# Patient Record
Sex: FEMALE | Race: BLACK OR AFRICAN AMERICAN
[De-identification: names, ages, dates, MRNs, and addresses within clinical notes are randomized per-mention and may not be internally consistent; named-entity substitution may affect disease eponyms.]

---

## 2018-08-02 PROBLEM — Z00.00 ENCOUNTER FOR PREVENTIVE HEALTH EXAMINATION: Status: ACTIVE | Noted: 2018-08-02

## 2018-08-17 ENCOUNTER — RESULT REVIEW (OUTPATIENT)
Age: 53
End: 2018-08-17

## 2018-10-15 ENCOUNTER — APPOINTMENT (OUTPATIENT)
Dept: UROLOGY | Facility: CLINIC | Age: 53
End: 2018-10-15
Payer: MEDICARE

## 2018-10-15 VITALS
HEART RATE: 82 BPM | HEIGHT: 66 IN | BODY MASS INDEX: 35.36 KG/M2 | WEIGHT: 220 LBS | SYSTOLIC BLOOD PRESSURE: 139 MMHG | DIASTOLIC BLOOD PRESSURE: 70 MMHG

## 2018-10-15 DIAGNOSIS — N20.0 CALCULUS OF KIDNEY: ICD-10-CM

## 2018-10-15 DIAGNOSIS — F17.200 NICOTINE DEPENDENCE, UNSPECIFIED, UNCOMPLICATED: ICD-10-CM

## 2018-10-15 DIAGNOSIS — Z80.8 FAMILY HISTORY OF MALIGNANT NEOPLASM OF OTHER ORGANS OR SYSTEMS: ICD-10-CM

## 2018-10-15 DIAGNOSIS — Z80.9 FAMILY HISTORY OF MALIGNANT NEOPLASM, UNSPECIFIED: ICD-10-CM

## 2018-10-15 DIAGNOSIS — Z78.9 OTHER SPECIFIED HEALTH STATUS: ICD-10-CM

## 2018-10-15 PROCEDURE — 99204 OFFICE O/P NEW MOD 45 MIN: CPT

## 2018-10-30 ENCOUNTER — OUTPATIENT (OUTPATIENT)
Dept: OUTPATIENT SERVICES | Facility: HOSPITAL | Age: 53
LOS: 1 days | Discharge: HOME | End: 2018-10-30

## 2018-10-30 VITALS
RESPIRATION RATE: 18 BRPM | HEART RATE: 60 BPM | WEIGHT: 221.34 LBS | HEIGHT: 67 IN | OXYGEN SATURATION: 98 % | TEMPERATURE: 97 F | SYSTOLIC BLOOD PRESSURE: 133 MMHG | DIASTOLIC BLOOD PRESSURE: 58 MMHG

## 2018-10-30 DIAGNOSIS — Z01.818 ENCOUNTER FOR OTHER PREPROCEDURAL EXAMINATION: ICD-10-CM

## 2018-10-30 DIAGNOSIS — N20.2 CALCULUS OF KIDNEY WITH CALCULUS OF URETER: ICD-10-CM

## 2018-10-30 DIAGNOSIS — Z98.51 TUBAL LIGATION STATUS: Chronic | ICD-10-CM

## 2018-10-30 LAB
ALBUMIN SERPL ELPH-MCNC: 4.4 G/DL — SIGNIFICANT CHANGE UP (ref 3.5–5.2)
ALP SERPL-CCNC: 74 U/L — SIGNIFICANT CHANGE UP (ref 30–115)
ALT FLD-CCNC: 19 U/L — SIGNIFICANT CHANGE UP (ref 0–41)
ANION GAP SERPL CALC-SCNC: 14 MMOL/L — SIGNIFICANT CHANGE UP (ref 7–14)
APPEARANCE UR: CLEAR — SIGNIFICANT CHANGE UP
APTT BLD: 33.8 SEC — SIGNIFICANT CHANGE UP (ref 27–39.2)
AST SERPL-CCNC: 16 U/L — SIGNIFICANT CHANGE UP (ref 0–41)
BASOPHILS # BLD AUTO: 0.02 K/UL — SIGNIFICANT CHANGE UP (ref 0–0.2)
BASOPHILS NFR BLD AUTO: 0.4 % — SIGNIFICANT CHANGE UP (ref 0–1)
BILIRUB SERPL-MCNC: 0.7 MG/DL — SIGNIFICANT CHANGE UP (ref 0.2–1.2)
BILIRUB UR-MCNC: NEGATIVE — SIGNIFICANT CHANGE UP
BUN SERPL-MCNC: 11 MG/DL — SIGNIFICANT CHANGE UP (ref 10–20)
CALCIUM SERPL-MCNC: 9.6 MG/DL — SIGNIFICANT CHANGE UP (ref 8.5–10.1)
CHLORIDE SERPL-SCNC: 102 MMOL/L — SIGNIFICANT CHANGE UP (ref 98–110)
CO2 SERPL-SCNC: 26 MMOL/L — SIGNIFICANT CHANGE UP (ref 17–32)
COLOR SPEC: YELLOW — SIGNIFICANT CHANGE UP
CREAT SERPL-MCNC: 0.8 MG/DL — SIGNIFICANT CHANGE UP (ref 0.7–1.5)
DIFF PNL FLD: NEGATIVE — SIGNIFICANT CHANGE UP
EOSINOPHIL # BLD AUTO: 0.01 K/UL — SIGNIFICANT CHANGE UP (ref 0–0.7)
EOSINOPHIL NFR BLD AUTO: 0.2 % — SIGNIFICANT CHANGE UP (ref 0–8)
GLUCOSE SERPL-MCNC: 89 MG/DL — SIGNIFICANT CHANGE UP (ref 70–99)
GLUCOSE UR QL: NEGATIVE MG/DL — SIGNIFICANT CHANGE UP
HCT VFR BLD CALC: 38.8 % — SIGNIFICANT CHANGE UP (ref 37–47)
HGB BLD-MCNC: 12.6 G/DL — SIGNIFICANT CHANGE UP (ref 12–16)
IMM GRANULOCYTES NFR BLD AUTO: 0.4 % — HIGH (ref 0.1–0.3)
INR BLD: 1.07 RATIO — SIGNIFICANT CHANGE UP (ref 0.65–1.3)
KETONES UR-MCNC: NEGATIVE — SIGNIFICANT CHANGE UP
LEUKOCYTE ESTERASE UR-ACNC: NEGATIVE — SIGNIFICANT CHANGE UP
LYMPHOCYTES # BLD AUTO: 1.99 K/UL — SIGNIFICANT CHANGE UP (ref 1.2–3.4)
LYMPHOCYTES # BLD AUTO: 43.6 % — SIGNIFICANT CHANGE UP (ref 20.5–51.1)
MCHC RBC-ENTMCNC: 29.6 PG — SIGNIFICANT CHANGE UP (ref 27–31)
MCHC RBC-ENTMCNC: 32.5 G/DL — SIGNIFICANT CHANGE UP (ref 32–37)
MCV RBC AUTO: 91.1 FL — SIGNIFICANT CHANGE UP (ref 81–99)
MONOCYTES # BLD AUTO: 0.48 K/UL — SIGNIFICANT CHANGE UP (ref 0.1–0.6)
MONOCYTES NFR BLD AUTO: 10.5 % — HIGH (ref 1.7–9.3)
NEUTROPHILS # BLD AUTO: 2.04 K/UL — SIGNIFICANT CHANGE UP (ref 1.4–6.5)
NEUTROPHILS NFR BLD AUTO: 44.9 % — SIGNIFICANT CHANGE UP (ref 42.2–75.2)
NITRITE UR-MCNC: NEGATIVE — SIGNIFICANT CHANGE UP
NRBC # BLD: 0 /100 WBCS — SIGNIFICANT CHANGE UP (ref 0–0)
PH UR: 6 — SIGNIFICANT CHANGE UP (ref 5–8)
PLATELET # BLD AUTO: 238 K/UL — SIGNIFICANT CHANGE UP (ref 130–400)
POTASSIUM SERPL-MCNC: 4 MMOL/L — SIGNIFICANT CHANGE UP (ref 3.5–5)
POTASSIUM SERPL-SCNC: 4 MMOL/L — SIGNIFICANT CHANGE UP (ref 3.5–5)
PROT SERPL-MCNC: 7.9 G/DL — SIGNIFICANT CHANGE UP (ref 6–8)
PROT UR-MCNC: ABNORMAL MG/DL
PROTHROM AB SERPL-ACNC: 12.3 SEC — SIGNIFICANT CHANGE UP (ref 9.95–12.87)
RBC # BLD: 4.26 M/UL — SIGNIFICANT CHANGE UP (ref 4.2–5.4)
RBC # FLD: 13.1 % — SIGNIFICANT CHANGE UP (ref 11.5–14.5)
SODIUM SERPL-SCNC: 142 MMOL/L — SIGNIFICANT CHANGE UP (ref 135–146)
SP GR SPEC: 1.02 — SIGNIFICANT CHANGE UP (ref 1.01–1.03)
UROBILINOGEN FLD QL: 0.2 MG/DL — SIGNIFICANT CHANGE UP (ref 0.2–0.2)
WBC # BLD: 4.56 K/UL — LOW (ref 4.8–10.8)
WBC # FLD AUTO: 4.56 K/UL — LOW (ref 4.8–10.8)
WBC UR QL: SIGNIFICANT CHANGE UP /HPF

## 2018-10-30 NOTE — H&P PST ADULT - REASON FOR ADMISSION
54 yo f presents to [facility] for ESWL  under LSB on 11/13/2018 at [facility] OR by DR. Orozco.

## 2018-10-30 NOTE — H&P PST ADULT - FAMILY HISTORY
Family history of brain cancer, Cirrhosis of the liver     Mother  Still living? No  Family history of cancer, Age at diagnosis: Age Unknown     Grandparent  Still living? No  Family history of breast cancer, Age at diagnosis: Age Unknown

## 2018-10-30 NOTE — H&P PST ADULT - NSANTHOSAYNRD_GEN_A_CORE
No. SHELLY screening performed.  STOP BANG Legend: 0-2 = LOW Risk; 3-4 = INTERMEDIATE Risk; 5-8 = HIGH Risk

## 2018-10-30 NOTE — H&P PST ADULT - VENOUS THROMBOEMBOLISM CURRENT STATUS
Pt arrives via EMS from PCP, pt reports hx of gastris. Family member then states pt is shob. Pt also reports coughing up bright red blood, given 4mg of zofran with EMS. EMS denies seeing any emesis. (0) indicator not present

## 2018-10-30 NOTE — H&P PST ADULT - HISTORY OF PRESENT ILLNESS
Patient c/o low back pain and diagnosed of a kidney stone x 3 years. Patient denies any c/o cp, sob, palpitations, fever, cough or dysuria. Ex tolerance of 1 fos walk with out SOB. No SHELLY. Patient c/o low back pain and diagnosed of a kidney stone x 3 years and increasing the size recently. Patient denies any c/o cp, sob, palpitations, fever, cough or dysuria. Ex tolerance of 1 fos walk with out SOB. No SHELLY.

## 2018-10-30 NOTE — H&P PST ADULT - PMH
Fall, sequela  Fall at work injured back, Left knees, right ankle and neck  Kidney stones    Osteoarthritis of cervical spine, unspecified spinal osteoarthritis complication status

## 2018-10-31 LAB
CULTURE RESULTS: NO GROWTH — SIGNIFICANT CHANGE UP
SPECIMEN SOURCE: SIGNIFICANT CHANGE UP

## 2018-11-13 ENCOUNTER — APPOINTMENT (OUTPATIENT)
Dept: UROLOGY | Facility: HOSPITAL | Age: 53
End: 2018-11-13

## 2019-04-10 ENCOUNTER — RESULT REVIEW (OUTPATIENT)
Age: 54
End: 2019-04-10

## 2020-06-11 ENCOUNTER — RESULT REVIEW (OUTPATIENT)
Age: 55
End: 2020-06-11

## 2021-06-14 ENCOUNTER — RESULT REVIEW (OUTPATIENT)
Age: 56
End: 2021-06-14

## 2023-02-10 ENCOUNTER — OUTPATIENT (OUTPATIENT)
Dept: OUTPATIENT SERVICES | Facility: HOSPITAL | Age: 58
LOS: 1 days | End: 2023-02-10
Payer: MEDICARE

## 2023-02-10 ENCOUNTER — EMERGENCY (EMERGENCY)
Facility: HOSPITAL | Age: 58
LOS: 0 days | Discharge: HOME | End: 2023-02-10
Attending: EMERGENCY MEDICINE
Payer: MEDICARE

## 2023-02-10 VITALS
SYSTOLIC BLOOD PRESSURE: 154 MMHG | HEART RATE: 98 BPM | OXYGEN SATURATION: 100 % | RESPIRATION RATE: 18 BRPM | TEMPERATURE: 99 F | DIASTOLIC BLOOD PRESSURE: 86 MMHG

## 2023-02-10 DIAGNOSIS — K08.89 OTHER SPECIFIED DISORDERS OF TEETH AND SUPPORTING STRUCTURES: ICD-10-CM

## 2023-02-10 DIAGNOSIS — Z88.6 ALLERGY STATUS TO ANALGESIC AGENT: ICD-10-CM

## 2023-02-10 DIAGNOSIS — Z98.51 TUBAL LIGATION STATUS: Chronic | ICD-10-CM

## 2023-02-10 DIAGNOSIS — E78.5 HYPERLIPIDEMIA, UNSPECIFIED: ICD-10-CM

## 2023-02-10 DIAGNOSIS — K02.9 DENTAL CARIES, UNSPECIFIED: ICD-10-CM

## 2023-02-10 PROBLEM — N20.0 CALCULUS OF KIDNEY: Chronic | Status: ACTIVE | Noted: 2018-10-30
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
## 2023-02-10 PROBLEM — M47.812 SPONDYLOSIS WITHOUT MYELOPATHY OR RADICULOPATHY, CERVICAL REGION: Chronic | Status: ACTIVE | Noted: 2018-10-30

## 2023-02-10 PROCEDURE — D7140: CPT

## 2023-02-10 PROCEDURE — 99284 EMERGENCY DEPT VISIT MOD MDM: CPT | Mod: 25

## 2023-02-10 PROCEDURE — 99284 EMERGENCY DEPT VISIT MOD MDM: CPT

## 2023-02-10 NOTE — CONSULT NOTE ADULT - SUBJECTIVE AND OBJECTIVE BOX
Patient is a 57y old  Female who presents with a chief complaint of pain in the UR and LR side.     HPI: Patient reports difficulty swelling on the left side.       PAST MEDICAL & SURGICAL HISTORY:  Osteoarthritis of cervical spine, unspecified spinal osteoarthritis complication status      Kidney stones      Fall, sequela  Fall at work injured back, Left knees, right ankle and neck      H/O tubal ligation        (   ) heart valve replacement  (   ) joint replacement  (   ) pregnancy    MEDICATIONS  (STANDING):    MEDICATIONS  (PRN):      Allergies    naproxen (Hives)    Intolerances        FAMILY HISTORY:  Family history of cancer (Mother)  Pancreatic    Family history of brain cancer  Cirrhosis of the liver    Family history of breast cancer (Grandparent)        *SOCIAL HISTORY: (   ) Tobacco; (   ) ETOH    *Last Dental Visit:    Vital Signs Last 24 Hrs  T(C): 37.1 (10 Feb 2023 14:50), Max: 37.1 (10 Feb 2023 14:50)  T(F): 98.7 (10 Feb 2023 14:50), Max: 98.7 (10 Feb 2023 14:50)  HR: 98 (10 Feb 2023 14:50) (98 - 98)  BP: 154/86 (10 Feb 2023 14:50) (154/86 - 154/86)  BP(mean): --  RR: 18 (10 Feb 2023 14:50) (18 - 18)  SpO2: 100% (10 Feb 2023 14:50) (100% - 100%)    Parameters below as of 10 Feb 2023 14:50  Patient On (Oxygen Delivery Method): room air        LABS:                  EOE:  TMJ ( -  ) clicks                     (-   ) pops                     (  - ) crepitus             Mandible <<FROM>>             Facial bones and MOM <<grossly intact>>             (-   ) trismus             ( -  ) lymphadenopathy             ( +  ) swelling             ( +  ) asymmetry             (  + ) palpation             (  - ) dyspnea             ( -  ) dysphagia             ( -  ) loss of consciousness    IOE:  <<permanent/primary/mixed>> dentition: <<grossly intact>> OR <<multiple carious teeth>> OR <<multiple missing teeth>>           hard/soft palate:  (   ) palatal torus, <<No pathology noted>>           tongue/FOM <<No pathology noted>>           labial/buccal mucosa <<No pathology noted>>           (+   ) percussion           (  + ) palpation           (  + ) swelling            (  - ) abscess           (   -) sinus tract    Dentition present: <<   >>  Mobility: <<  >>  Caries: <<   >>         *DENTAL RADIOGRAPHS: Panoramic x-ray and periapical x-rays     RADIOLOGY & ADDITIONAL STUDIES:    *ASSESSMENT: Clinically observed right side sublingual swelling and tooth #31 pain to percussion and gross caries, non restorable #3.       *PLAN: Extraction of tooth #3 and #31     PROCEDURE:    Consents and site obtained. Risks and benefits discussed with patient as per OS sheet dated 07/13/00. Administered 4 carpules of 2% lidocaine with 1:100,000 epinephrine via right inferior alveolar block and buccal infiltration. Throat screen placed. Elevated and extracted #3 and #31#. Incision made in site #31. Irrigated with saline solution. Hemostasis achieved. Post-op periapical  x-ray taken. Post- op instructions given to the patient.   RX: Acetaminophen 500 mg 2 tablets every 6 hours for pain  Augmentin 500 mg 125 mh tablet every 12 hours   Peridex rinse     RECOMMENDATIONS:  1) <<   >>  2) Dental F/U with outpatient dentist for comprehensive dental care.   3) If any difficulty swallowing/breathing, fever occur, return to ER.     Resident Name, pager #

## 2023-02-10 NOTE — ED PROVIDER NOTE - DIFFERENTIAL DIAGNOSIS
Differential Diagnosis periapical abscess vs dental sena, less concerned for severe deeper space infection at this time but will get to dental clinic for definitive management so that it does not progress

## 2023-02-10 NOTE — ED PROVIDER NOTE - PHYSICAL EXAMINATION
on exam, nontoxic, vss   Gen: Alert, in NAD  Skin: Warm, dry, intact   Head: Atraumatic  Eyes: EOMI  ENMT: Oral mucosa moist, posterior OP w/o exudates or erythema, swelling, masses or bulging; R mild lower facial swelling. no submandibular ttp but mild swelling. minimal prominence of R sublingual salivary gland but no diffuse edema or signs of ludwigs; ttp and fx at #29/31 w/o gingival fluctuance. fx #3.   Neck: Supple  CV: Normal peripheral perfusion; normal WOB  Resp: Non labored respirations  Extremities: Normal ROM, no edema  Neuro: No focal neuro deficits  Psych: Cooperative

## 2023-02-10 NOTE — ED PROVIDER NOTE - OBJECTIVE STATEMENT
57yoF with pmh of HLD sent from dental clinic at the [facility] for drainage. pain R lower mandibular area x4 days. also chronic pain #3. +swelling and arthralgias. no other neck pain or any trouble breathing. denies fever.

## 2023-05-17 DIAGNOSIS — K08.9 DISORDER OF TEETH AND SUPPORTING STRUCTURES, UNSPECIFIED: ICD-10-CM

## 2023-09-27 NOTE — H&P PST ADULT - TEACHING/LEARNING CULTURAL CONSIDERATIONS
Preprocedure diagnosis: Facial scarring    Post procedure diagnosis: Facial scarring    Procedure performed: 1540 nm xD erbium-doped glass laser treatment of scar of the left lower eyelid and cheek (Treatment #2 of 6 planned)    Surgeon: Allen Colbert M.D.    Anesthesia: None    Details: After a thorough discussion of the potential risks of laser therapy including burning to the skin, recurrence, damage to vision, color changes, patient was taken to the procedure room.      The 1540 xD handpiece was placed and set at 15 ms/50 mJ/mB and used to perform 6 passes, while rotating the handpiece prior to each pulse.  Prior to each pulse, the handpiece was rested on the skin with mild pressure for 3 seconds.    The patient tolerated the procedure well.    Electronically signed by Allen Colbert MD, 09/27/23, 10:35 AM CDT.      
none